# Patient Record
(demographics unavailable — no encounter records)

---

## 2024-12-27 NOTE — DISCUSSION/SUMMARY
[de-identified] : Discussed findings of today's exam and possible causes of patient's pain.  Educated patient on their most probable diagnosis of acute right shoulder dysfunction status post fall due to likely complete rotator cuff tear.  Reviewed possible courses of treatment, and we collaboratively decided best course of treatment at this time will include conservative management.  Patient has significant gross dysfunction of the right shoulder after recent fall, there is no evidence of acute fracture on x-ray, there is high clinical suspicion for acute complete rotator cuff tear.  Patient has history of left shoulder rotator cuff repair after a fall, it is likely that he would require surgical intervention for this acute right shoulder injury.  At this time I recommend we proceed with MRI of the right shoulder to evaluate for extent of rotator cuff pathology, patient would likely benefit from surgical consultation thereafter.  He is advised we can review MRI results over the phone and I will help facilitate timely consultation with one of my orthopedic associates accordingly.  Patient has not any significant pain, no need for prescription medications at this time.  Patient appreciates and agrees with current plan.  This note was generated using dragon medical dictation software.  A reasonable effort has been made for proofreading its contents, but typos may still remain.  If there are any questions or points of clarification needed please notify my office.

## 2024-12-27 NOTE — PHYSICAL EXAM
[de-identified] : Constitutional: Well-nourished, well-developed, No acute distress Respiratory:  Good respiratory effort, no SOB Lymphatic: No regional lymphadenopathy, no lymphedema Psychiatric: Pleasant and normal affect, alert and oriented x3 Musculoskeletal: normal except where as noted in regional exam  Right shoulder:  APPEARANCE: No swelling/ecchymoses, no marked deformities or malalignment POSITIVE TENDERNESS: Moderately around the anterior, lateral and posterior shoulder areas NONTENDER: AC joint  ROM: Patient has normal passive range of motion, but no active range of motion in regards to flexion or abduction RESISTIVE TESTING: painful 3/5 IR 2/5 flex/ext, empty can/ER  Vasc: 2+ radial pulse Neuro: AIN, PIN, Ulnar nerve intact to motor Sensation: Intact to light touch throughout [de-identified] : The following radiographs were ordered and read by me during this patient's visit. I reviewed each radiograph in detail with the patient and discussed the findings as highlighted below.   3 views of the right shoulder were obtained today that show degenerative changes and evidence of mild glenohumeral osteoarthritis with a small calcific deposit seen of the superior humeral head.  No acute fracture, or dislocation seen at this time. There is no malalignment. No other obvious osseous abnormality. Otherwise unremarkable.

## 2024-12-27 NOTE — HISTORY OF PRESENT ILLNESS
[de-identified] : 77 M presents with right shoulder pain since falling on ice 12/24/24 He had immediate right shoulder pain after his fall He endorses that he cannot raise his arm  he also endorses pain in his right lateral shoulder He had a previous rotator cuff repair on the left arm by Dr. Cm He has been taking advil for pain  He is planning on following up with Dr. Cm however his office suggested performing an MRI

## 2024-12-27 NOTE — PHYSICAL EXAM
[de-identified] : Constitutional: Well-nourished, well-developed, No acute distress Respiratory:  Good respiratory effort, no SOB Lymphatic: No regional lymphadenopathy, no lymphedema Psychiatric: Pleasant and normal affect, alert and oriented x3 Musculoskeletal: normal except where as noted in regional exam  Right shoulder:  APPEARANCE: No swelling/ecchymoses, no marked deformities or malalignment POSITIVE TENDERNESS: Moderately around the anterior, lateral and posterior shoulder areas NONTENDER: AC joint  ROM: Patient has normal passive range of motion, but no active range of motion in regards to flexion or abduction RESISTIVE TESTING: painful 3/5 IR 2/5 flex/ext, empty can/ER  Vasc: 2+ radial pulse Neuro: AIN, PIN, Ulnar nerve intact to motor Sensation: Intact to light touch throughout [de-identified] : The following radiographs were ordered and read by me during this patient's visit. I reviewed each radiograph in detail with the patient and discussed the findings as highlighted below.   3 views of the right shoulder were obtained today that show degenerative changes and evidence of mild glenohumeral osteoarthritis with a small calcific deposit seen of the superior humeral head.  No acute fracture, or dislocation seen at this time. There is no malalignment. No other obvious osseous abnormality. Otherwise unremarkable.

## 2024-12-27 NOTE — DISCUSSION/SUMMARY
[de-identified] : Discussed findings of today's exam and possible causes of patient's pain.  Educated patient on their most probable diagnosis of acute right shoulder dysfunction status post fall due to likely complete rotator cuff tear.  Reviewed possible courses of treatment, and we collaboratively decided best course of treatment at this time will include conservative management.  Patient has significant gross dysfunction of the right shoulder after recent fall, there is no evidence of acute fracture on x-ray, there is high clinical suspicion for acute complete rotator cuff tear.  Patient has history of left shoulder rotator cuff repair after a fall, it is likely that he would require surgical intervention for this acute right shoulder injury.  At this time I recommend we proceed with MRI of the right shoulder to evaluate for extent of rotator cuff pathology, patient would likely benefit from surgical consultation thereafter.  He is advised we can review MRI results over the phone and I will help facilitate timely consultation with one of my orthopedic associates accordingly.  Patient has not any significant pain, no need for prescription medications at this time.  Patient appreciates and agrees with current plan.  This note was generated using dragon medical dictation software.  A reasonable effort has been made for proofreading its contents, but typos may still remain.  If there are any questions or points of clarification needed please notify my office.

## 2024-12-27 NOTE — HISTORY OF PRESENT ILLNESS
[de-identified] : 77 M presents with right shoulder pain since falling on ice 12/24/24 He had immediate right shoulder pain after his fall He endorses that he cannot raise his arm  he also endorses pain in his right lateral shoulder He had a previous rotator cuff repair on the left arm by Dr. Cm He has been taking advil for pain  He is planning on following up with Dr. Cm however his office suggested performing an MRI

## 2024-12-30 NOTE — PHYSICAL EXAM
[Normal] : Gait: normal [UE] : Sensory: Intact in bilateral upper extremities [Rad] : radial 2+ and symmetric bilaterally [de-identified] : Right shoulder: Skin intact.  No warmth or redness present.  Patient has difficulty lifting the arm away from his side and overhead.  Passively the arm can be lifted overhead to 135 degrees.  Pain and marked weakness with empty can testing.  Pain/mild weakness with resisted external rotation strength testing with the arm at the side.  Intact belly press test. [de-identified] : MRI scans right shoulder December 27, 2024 notable for joint effusion with bursal swelling and medium sized tear of the supraspinatus tendon.

## 2024-12-30 NOTE — DISCUSSION/SUMMARY
[de-identified] : Patient suffered a fall and has exacerbation of his right shoulder.  Previous cuff tear has increased in size.  He has joint inflammation.  Recommend rest and ice application of the shoulder and continued observation with follow-up in 2 weeks.  If he has persistent functionally limiting pain/weakness and the surgical option would be a rotator cuff repair.

## 2024-12-30 NOTE — HISTORY OF PRESENT ILLNESS
[de-identified] : 77 year old RHD male presents with right shoulder pain s/p fall on ice 12/24/24. He landed directly on the right shoulder. He was evaluated by Dr. Carreno and was referred for an MRI. He presents today for further evaluation. He is unable to elevate his arm and has limited function. He is taking Advil before bed. Patient had prior right shoulder MRI 03/2023 revealing tendinopathy changes of his rotator cuff with some partial tearing and a possibly small full-thickness tear of the supraspinatus tendon  Left shoulder rotator cuff repair in 2013